# Patient Record
Sex: FEMALE | Race: WHITE | NOT HISPANIC OR LATINO | URBAN - METROPOLITAN AREA
[De-identification: names, ages, dates, MRNs, and addresses within clinical notes are randomized per-mention and may not be internally consistent; named-entity substitution may affect disease eponyms.]

---

## 2017-03-23 ENCOUNTER — OFFICE VISIT - RIVER FALLS (OUTPATIENT)
Dept: FAMILY MEDICINE | Facility: CLINIC | Age: 32
End: 2017-03-23

## 2017-03-23 ASSESSMENT — MIFFLIN-ST. JEOR: SCORE: 1228.83

## 2022-02-11 VITALS
SYSTOLIC BLOOD PRESSURE: 105 MMHG | DIASTOLIC BLOOD PRESSURE: 67 MMHG | BODY MASS INDEX: 25.45 KG/M2 | WEIGHT: 134.8 LBS | TEMPERATURE: 98.4 F | HEART RATE: 63 BPM | HEIGHT: 61 IN

## 2022-02-15 NOTE — PROGRESS NOTES
Patient:   SHANDRA FLORES            MRN: [id number]            FIN: [id number]               Age:   32 years     Sex:  Female     :  1985   Associated Diagnoses:   Amenorrhea; Annual physical exam   Author:   Eliceo Simmons MD      Chief Complaint   3/23/2017 3:08 PM CDT    Here for physical and discuss referral to OBgyn        Well Adult History   Missed menses in February and likely 8wks pregnant. Never stopped taking prenatal vitamin.  Never abnormal pap smear.    One son with autism  Daughter with Type I diabetes  First pregnancy with baby had lethal congential and orthopedic anomalies and  after a month  Last son had single umbilical artery but no complications with it. Induced at 37 weeks due to decreased growth  Last two pregnancies at term      Review of Systems   Constitutional:  No fever.    Eye:  No recent visual problem.    Respiratory:  No shortness of breath, No cough.    Cardiovascular:  No chest pain, No palpitations, No peripheral edema.    Breast:  Negative.    Genitourinary:  No change in urine stream.    Hematology/Lymphatics:  No bruising tendency, No bleeding tendency.    Endocrine:  Negative.    Musculoskeletal:  No joint pain, No muscle pain.    Integumentary:  No rash.    Neurologic:  Alert and oriented X4, No abnormal balance, No numbness, No headache.    Psychiatric:  Negative.    Having morning sickness   All other systems reviewed and negative      Health Status   Allergies:    Allergic Reactions (Selected)  No known allergies   Medications:  (Selected)   Documented Medications  Documented  Prenatal Multivitamins with Vitamin B Complex, Vitamin C, Minerals and L-Methylfolate oral capsule...: 1 cap(s), po, daily, 0 Refill(s), Type: Maintenance   Problem list:    All Problems  Fetal hydronephrosis / SNOMED CT 9378885844 / Confirmed  Genital Herpes / ICD-9-.10 / Confirmed  Single umbilical artery / SNOMED CT 702021802 / Confirmed  Inactive: Tobacco abuse / SNOMED CT  433056866  Resolved: Chemical Dependency / ICD-9-.90  Resolved: Pregnancy / SNOMED CT 756269132  Resolved: Pregnancy / SNOMED CT 918784780  Resolved: Pregnancy / SNOMED CT 953731368  Resolved: Pregnant / SNOMED CT 419574882      Histories   Family History:    Patient was adopted.    Procedure history:    Colonoscopy (ICD-9-CM 45.23) performed by Andrew Garza on 2014 at 29 Years.  Comments:  2014 10:01 AM - [place] REGALADO, Susana  Sedation: MAC  Indication:  chronic diarrhea, hematochezia  Random biopsies: colorectal-type mucosa without significant histologic alteration.  No evidence of microscopic or other colitis and no neoplastic change noted.  Resume routine screening at age 48.  Repair of inguinal hernia - Left (SNOMED CT 16824929) performed by Denis Taylor MD on 10/22/2012 at 27 Years.  Childbirth (SNOMED CT 7410458978) performed by Eliceo Simmons MD on 2010 at 25 Years.  Comments:  2010 7:02 PM - Anne Lau   (male)  Amniocentesis (SNOMED CT 11516553) performed by Cristiano Carlos MD on 2010 at 25 Years.  Comments:  2/3/2011 11:07 PM - Anne Lau  Intrauterine pregnancy at 35 weeks,  labor.   - Spontaneous vaginal delivery (SNOMED CT 8860738928) in the month of 2009 at 24 Years.  Comments:  2010 10:29 AM - Elmo  Maricel   2009 -complications  had hydrocephalas   Social History:  and works [employer]  [employer]. Live in [facility]. Drug free 2008. Quit smoking . Has cats and  takes care of cat litter      Physical Examination   Vital Signs   3/23/2017 3:08 PM CDT Temperature Tympanic 98.4 DegF    Peripheral Pulse Rate 63 bpm    Systolic Blood Pressure 105 mmHg    Diastolic Blood Pressure 67 mmHg    Mean Arterial Pressure 80 mmHg      Measurements from flowsheet : Measurements   3/23/2017 3:08 PM CDT Height Measured - Standard 61 in    Weight Measured - Standard 134.8 lb    BSA 1.62 m2    Body Mass  Index 25.47 kg/m2      General:  Alert and oriented, No acute distress.    Eye:  Normal conjunctiva.    HENT:  Tympanic membranes are clear, No pharyngeal erythema.    Neck:  Non-tender, No lymphadenopathy, No thyromegaly.    Respiratory:  Lungs are clear to auscultation, Respirations are non-labored.    Cardiovascular:  Normal rate, Regular rhythm, No murmur.    Breast:  No mass, No tenderness, No discharge.    Gastrointestinal:  Soft, Non-tender, Non-distended.    Gynecologic:  defers.    Musculoskeletal:  Normal range of motion, Normal strength, No swelling, Normal gait.    Integumentary:  Pink, No rash.    Neurologic:  Alert, Oriented, Normal sensory, Normal motor function, No focal deficits.    Psychiatric:  Appropriate mood & affect.       Impression and Plan   Diagnosis     Amenorrhea (PVG61-MI N91.2).     Annual physical exam (SAX80-QG Z00.00).       Cervical Cancer Screening: negative pap smear with no HPV 2020  Immunizations: Adacel November 2015  Had some postpartum depression last delivery. Feels fine now  Morning sickness: will give reglan if needed  Last episode of genital herpes several years ago and will need prophylaxis near term